# Patient Record
Sex: MALE | Race: WHITE | NOT HISPANIC OR LATINO
[De-identification: names, ages, dates, MRNs, and addresses within clinical notes are randomized per-mention and may not be internally consistent; named-entity substitution may affect disease eponyms.]

---

## 2018-07-19 ENCOUNTER — APPOINTMENT (OUTPATIENT)
Dept: ORTHOPEDIC SURGERY | Facility: CLINIC | Age: 43
End: 2018-07-19
Payer: COMMERCIAL

## 2018-07-19 DIAGNOSIS — G56.01 CARPAL TUNNEL SYNDROME, RIGHT UPPER LIMB: ICD-10-CM

## 2018-07-19 DIAGNOSIS — F17.200 NICOTINE DEPENDENCE, UNSPECIFIED, UNCOMPLICATED: ICD-10-CM

## 2018-07-19 DIAGNOSIS — Z00.00 ENCOUNTER FOR GENERAL ADULT MEDICAL EXAMINATION W/OUT ABNORMAL FINDINGS: ICD-10-CM

## 2018-07-19 PROCEDURE — 99203 OFFICE O/P NEW LOW 30 MIN: CPT

## 2018-07-19 RX ORDER — BENZONATATE 200 MG/1
200 CAPSULE ORAL
Qty: 21 | Refills: 0 | Status: ACTIVE | COMMUNITY
Start: 2018-03-01

## 2019-10-23 ENCOUNTER — APPOINTMENT (OUTPATIENT)
Dept: ORTHOPEDIC SURGERY | Facility: CLINIC | Age: 44
End: 2019-10-23
Payer: COMMERCIAL

## 2019-10-23 VITALS — SYSTOLIC BLOOD PRESSURE: 144 MMHG | HEART RATE: 66 BPM | DIASTOLIC BLOOD PRESSURE: 84 MMHG

## 2019-10-23 DIAGNOSIS — M19.011 PRIMARY OSTEOARTHRITIS, RIGHT SHOULDER: ICD-10-CM

## 2019-10-23 PROCEDURE — 99203 OFFICE O/P NEW LOW 30 MIN: CPT

## 2019-10-23 PROCEDURE — 99213 OFFICE O/P EST LOW 20 MIN: CPT

## 2019-10-23 PROCEDURE — 73030 X-RAY EXAM OF SHOULDER: CPT

## 2020-05-13 ENCOUNTER — EMERGENCY (EMERGENCY)
Facility: HOSPITAL | Age: 45
LOS: 1 days | Discharge: HOME | End: 2020-05-13
Attending: EMERGENCY MEDICINE | Admitting: EMERGENCY MEDICINE
Payer: COMMERCIAL

## 2020-05-13 VITALS
SYSTOLIC BLOOD PRESSURE: 136 MMHG | DIASTOLIC BLOOD PRESSURE: 82 MMHG | RESPIRATION RATE: 16 BRPM | HEART RATE: 74 BPM | TEMPERATURE: 98 F | OXYGEN SATURATION: 100 %

## 2020-05-13 VITALS
TEMPERATURE: 98 F | RESPIRATION RATE: 16 BRPM | OXYGEN SATURATION: 100 % | HEART RATE: 57 BPM | DIASTOLIC BLOOD PRESSURE: 75 MMHG | SYSTOLIC BLOOD PRESSURE: 116 MMHG

## 2020-05-13 LAB
ALBUMIN SERPL ELPH-MCNC: 3.9 G/DL — SIGNIFICANT CHANGE UP (ref 3.3–5)
ALP SERPL-CCNC: 65 U/L — SIGNIFICANT CHANGE UP (ref 40–120)
ALT FLD-CCNC: 9 U/L — SIGNIFICANT CHANGE UP (ref 4–41)
ANION GAP SERPL CALC-SCNC: 11 MMO/L — SIGNIFICANT CHANGE UP (ref 7–14)
ANISOCYTOSIS BLD QL: SIGNIFICANT CHANGE UP
APPEARANCE UR: SIGNIFICANT CHANGE UP
AST SERPL-CCNC: 11 U/L — SIGNIFICANT CHANGE UP (ref 4–40)
BACTERIA # UR AUTO: NEGATIVE — SIGNIFICANT CHANGE UP
BASOPHILS # BLD AUTO: 0.06 K/UL — SIGNIFICANT CHANGE UP (ref 0–0.2)
BASOPHILS NFR BLD AUTO: 0.2 % — SIGNIFICANT CHANGE UP (ref 0–2)
BASOPHILS NFR SPEC: 0 % — SIGNIFICANT CHANGE UP (ref 0–2)
BILIRUB SERPL-MCNC: 0.5 MG/DL — SIGNIFICANT CHANGE UP (ref 0.2–1.2)
BILIRUB UR-MCNC: NEGATIVE — SIGNIFICANT CHANGE UP
BLASTS # FLD: 0 % — SIGNIFICANT CHANGE UP (ref 0–0)
BLOOD UR QL VISUAL: HIGH
BUN SERPL-MCNC: 15 MG/DL — SIGNIFICANT CHANGE UP (ref 7–23)
CALCIUM SERPL-MCNC: 9.5 MG/DL — SIGNIFICANT CHANGE UP (ref 8.4–10.5)
CHLORIDE SERPL-SCNC: 99 MMOL/L — SIGNIFICANT CHANGE UP (ref 98–107)
CO2 SERPL-SCNC: 27 MMOL/L — SIGNIFICANT CHANGE UP (ref 22–31)
COLOR SPEC: YELLOW — SIGNIFICANT CHANGE UP
CREAT SERPL-MCNC: 1.12 MG/DL — SIGNIFICANT CHANGE UP (ref 0.5–1.3)
EOSINOPHIL # BLD AUTO: 0.04 K/UL — SIGNIFICANT CHANGE UP (ref 0–0.5)
EOSINOPHIL NFR BLD AUTO: 0.2 % — SIGNIFICANT CHANGE UP (ref 0–6)
EOSINOPHIL NFR FLD: 0.9 % — SIGNIFICANT CHANGE UP (ref 0–6)
GIANT PLATELETS BLD QL SMEAR: PRESENT — SIGNIFICANT CHANGE UP
GLUCOSE SERPL-MCNC: 128 MG/DL — HIGH (ref 70–99)
GLUCOSE UR-MCNC: NEGATIVE — SIGNIFICANT CHANGE UP
HCT VFR BLD CALC: 40.3 % — SIGNIFICANT CHANGE UP (ref 39–50)
HGB BLD-MCNC: 13 G/DL — SIGNIFICANT CHANGE UP (ref 13–17)
HIV COMBO RESULT: SIGNIFICANT CHANGE UP
HIV1+2 AB SPEC QL: SIGNIFICANT CHANGE UP
IMM GRANULOCYTES NFR BLD AUTO: 0.9 % — SIGNIFICANT CHANGE UP (ref 0–1.5)
KETONES UR-MCNC: NEGATIVE — SIGNIFICANT CHANGE UP
LEUKOCYTE ESTERASE UR-ACNC: SIGNIFICANT CHANGE UP
LYMPHOCYTES # BLD AUTO: 10 % — LOW (ref 13–44)
LYMPHOCYTES # BLD AUTO: 2.48 K/UL — SIGNIFICANT CHANGE UP (ref 1–3.3)
LYMPHOCYTES NFR SPEC AUTO: 10 % — LOW (ref 13–44)
MACROCYTES BLD QL: SLIGHT — SIGNIFICANT CHANGE UP
MCHC RBC-ENTMCNC: 28.5 PG — SIGNIFICANT CHANGE UP (ref 27–34)
MCHC RBC-ENTMCNC: 32.3 % — SIGNIFICANT CHANGE UP (ref 32–36)
MCV RBC AUTO: 88.4 FL — SIGNIFICANT CHANGE UP (ref 80–100)
METAMYELOCYTES # FLD: 0 % — SIGNIFICANT CHANGE UP (ref 0–1)
MONOCYTES # BLD AUTO: 1.62 K/UL — HIGH (ref 0–0.9)
MONOCYTES NFR BLD AUTO: 6.5 % — SIGNIFICANT CHANGE UP (ref 2–14)
MONOCYTES NFR BLD: 5.5 % — SIGNIFICANT CHANGE UP (ref 2–9)
MYELOCYTES NFR BLD: 0 % — SIGNIFICANT CHANGE UP (ref 0–0)
NEUTROPHIL AB SER-ACNC: 81.8 % — HIGH (ref 43–77)
NEUTROPHILS # BLD AUTO: 20.36 K/UL — HIGH (ref 1.8–7.4)
NEUTROPHILS NFR BLD AUTO: 82.2 % — HIGH (ref 43–77)
NEUTS BAND # BLD: 1.8 % — SIGNIFICANT CHANGE UP (ref 0–6)
NITRITE UR-MCNC: NEGATIVE — SIGNIFICANT CHANGE UP
NRBC # FLD: 0 K/UL — SIGNIFICANT CHANGE UP (ref 0–0)
OTHER - HEMATOLOGY %: 0 — SIGNIFICANT CHANGE UP
OVALOCYTES BLD QL SMEAR: SLIGHT — SIGNIFICANT CHANGE UP
PH UR: 6 — SIGNIFICANT CHANGE UP (ref 5–8)
PLATELET # BLD AUTO: 292 K/UL — SIGNIFICANT CHANGE UP (ref 150–400)
PLATELET COUNT - ESTIMATE: NORMAL — SIGNIFICANT CHANGE UP
PMV BLD: 10 FL — SIGNIFICANT CHANGE UP (ref 7–13)
POIKILOCYTOSIS BLD QL AUTO: SLIGHT — SIGNIFICANT CHANGE UP
POLYCHROMASIA BLD QL SMEAR: SIGNIFICANT CHANGE UP
POTASSIUM SERPL-MCNC: 4.1 MMOL/L — SIGNIFICANT CHANGE UP (ref 3.5–5.3)
POTASSIUM SERPL-SCNC: 4.1 MMOL/L — SIGNIFICANT CHANGE UP (ref 3.5–5.3)
PROMYELOCYTES # FLD: 0 % — SIGNIFICANT CHANGE UP (ref 0–0)
PROT SERPL-MCNC: 7.8 G/DL — SIGNIFICANT CHANGE UP (ref 6–8.3)
PROT UR-MCNC: 100 — HIGH
RBC # BLD: 4.56 M/UL — SIGNIFICANT CHANGE UP (ref 4.2–5.8)
RBC # FLD: 12.4 % — SIGNIFICANT CHANGE UP (ref 10.3–14.5)
RBC CASTS # UR COMP ASSIST: HIGH (ref 0–?)
SODIUM SERPL-SCNC: 137 MMOL/L — SIGNIFICANT CHANGE UP (ref 135–145)
SP GR SPEC: 1.03 — SIGNIFICANT CHANGE UP (ref 1–1.04)
SQUAMOUS # UR AUTO: SIGNIFICANT CHANGE UP
UROBILINOGEN FLD QL: SIGNIFICANT CHANGE UP
VARIANT LYMPHS # BLD: 0 % — SIGNIFICANT CHANGE UP
WBC # BLD: 24.79 K/UL — HIGH (ref 3.8–10.5)
WBC # FLD AUTO: 24.79 K/UL — HIGH (ref 3.8–10.5)
WBC UR QL: >50 — HIGH (ref 0–?)

## 2020-05-13 PROCEDURE — 76870 US EXAM SCROTUM: CPT | Mod: 26

## 2020-05-13 PROCEDURE — 99285 EMERGENCY DEPT VISIT HI MDM: CPT

## 2020-05-13 RX ORDER — ACETAMINOPHEN 500 MG
650 TABLET ORAL ONCE
Refills: 0 | Status: COMPLETED | OUTPATIENT
Start: 2020-05-13 | End: 2020-05-13

## 2020-05-13 RX ORDER — SODIUM CHLORIDE 9 MG/ML
1000 INJECTION INTRAMUSCULAR; INTRAVENOUS; SUBCUTANEOUS ONCE
Refills: 0 | Status: COMPLETED | OUTPATIENT
Start: 2020-05-13 | End: 2020-05-13

## 2020-05-13 RX ORDER — KETOROLAC TROMETHAMINE 30 MG/ML
15 SYRINGE (ML) INJECTION ONCE
Refills: 0 | Status: DISCONTINUED | OUTPATIENT
Start: 2020-05-13 | End: 2020-05-13

## 2020-05-13 RX ORDER — MORPHINE SULFATE 50 MG/1
4 CAPSULE, EXTENDED RELEASE ORAL ONCE
Refills: 0 | Status: DISCONTINUED | OUTPATIENT
Start: 2020-05-13 | End: 2020-05-13

## 2020-05-13 RX ORDER — CEFTRIAXONE 500 MG/1
250 INJECTION, POWDER, FOR SOLUTION INTRAMUSCULAR; INTRAVENOUS ONCE
Refills: 0 | Status: COMPLETED | OUTPATIENT
Start: 2020-05-13 | End: 2020-05-13

## 2020-05-13 RX ADMIN — CEFTRIAXONE 250 MILLIGRAM(S): 500 INJECTION, POWDER, FOR SOLUTION INTRAMUSCULAR; INTRAVENOUS at 09:46

## 2020-05-13 RX ADMIN — SODIUM CHLORIDE 1000 MILLILITER(S): 9 INJECTION INTRAMUSCULAR; INTRAVENOUS; SUBCUTANEOUS at 08:09

## 2020-05-13 RX ADMIN — SODIUM CHLORIDE 500 MILLILITER(S): 9 INJECTION INTRAMUSCULAR; INTRAVENOUS; SUBCUTANEOUS at 07:09

## 2020-05-13 RX ADMIN — Medication 15 MILLIGRAM(S): at 07:08

## 2020-05-13 RX ADMIN — Medication 100 MILLIGRAM(S): at 09:46

## 2020-05-13 RX ADMIN — Medication 650 MILLIGRAM(S): at 07:08

## 2020-05-13 RX ADMIN — MORPHINE SULFATE 4 MILLIGRAM(S): 50 CAPSULE, EXTENDED RELEASE ORAL at 08:15

## 2020-05-13 NOTE — ED PROVIDER NOTE - PROGRESS NOTE DETAILS
SHIRA PALMER: Patient signed out to me by resident Dr. Franks to f/u testicular US w/ concern for pylocele on previous US. Will continue to monitor and reassess. PA SPENCER: US testicle shows epididymo-orchitis, no fluid collection, no mass. As per attending, will give Ceftriaxone IM 250mg once and Doxycycline BIDx10 days. Pt admits he took Augmentin x2 last night. Plan: antibiotic and discharge w/ f/u with urology. PA SPENCER: Pt is medically stable for discharge and follow up with PMD and urology. The patient was given verbal and written discharge instructions. Specifically, instructions when to return to the ED and when to seek follow-up from their pcp was discussed. Any specialty follow-up was discussed, including how to make an appointment.  Instructions were discussed in simple, plain language and was understood by the patient. The patient understands that should their symptoms worsen or any new symptoms arise, they should return to the ED immediately for further evaluation. All pt's questions were answered. Patient verbalizes understanding.

## 2020-05-13 NOTE — ED PROVIDER NOTE - OBJECTIVE STATEMENT
44M hx psoriasis, p/w right testicular pain/ swelling x 4-5 days, with low grade subjective fevers, with 8/10 heavy pain sensation, with +urinary urgency. Sent by PMD for outpt ultrasound results from yesterday with findings of ' R epididymoorchitis with overlying probable pyocele'. Pt states right testicle swelling has been improving and pain improving,  no reported dysuria or penile discharge. Monogamous with one partner. Denied STD risks. No GI complaints. Pt was started on abx, last dose last evening, cant remember the name of it but states it is q6h. Last tylenol at 2am     Dr Collazo [phone] 44M hx psoriasis, p/w right testicular pain/ swelling x 4-5 days, with low grade subjective fevers, with 8/10 heavy pain sensation, with +urinary urgency. Sent by PMD for outpt ultrasound results from yesterday with findings of ' R epididymoorchitis with overlying probable pyocele'. Pt states right testicle swelling has been improving and pain improving,  no reported dysuria or penile discharge. Monogamous with one partner. Denied STD risks. No GI complaints. Pt was started on abx, last dose last evening, cant remember the name of it but states it is q6h. Last tylenol at 2am     pmd Dr Collazo [phone] 44M hx psoriasis, p/w right testicular pain/ swelling x 4-5 days, with low grade subjective fevers, with 8/10 heavy pain sensation, with +urinary urgency. Sent by PMD for outpt ultrasound results from yesterday with findings of ' R epididymoorchitis with overlying probable pyocele'. Pt states right testicle swelling has been improving and pain improving,  no reported dysuria or penile discharge. Monogamous with one partner. Denied STD risks. No GI complaints. Pt was started on abx yesterday, last dose last evening, cant remember the name of it but states it is q6h and a penicillin. Last tylenol at 2am     pmd Dr Collazo [phone]

## 2020-05-13 NOTE — ED ADULT TRIAGE NOTE - CHIEF COMPLAINT QUOTE
Pt awake, alert sent in my PMD for infection of testicle. Pain present to testicle area, subjective fevers. No PMH, denies allergies

## 2020-05-13 NOTE — ED PROVIDER NOTE - PATIENT PORTAL LINK FT
You can access the FollowMyHealth Patient Portal offered by [facility] by registering at the following website: [email or web address]. By joining [employer]’s FollowMyHealth portal, you will also be able to view your health information using other applications (apps) compatible with our system.

## 2020-05-13 NOTE — ED PROVIDER NOTE - NSFOLLOWUPINSTRUCTIONS_ED_ALL_ED_FT
Rest, drink plenty of fluids.  Advance activity as tolerated. Take Doxycycline 100 mg, two times a day for 10 days. Follow up with your primary care physician and urology in 48-72 hours- bring copies of your results. Take Motrin 600 mg every 8 hours as needed for moderate pain -- take with food. Return to the ER for worsening or persistent symptoms, and/or ANY NEW OR CONCERNING SYMPTOMS. If you have issues obtaining follow up, please call: [phone] to obtain a doctor or specialist who takes your insurance in your area.

## 2020-05-13 NOTE — ED PROVIDER NOTE - ATTENDING CONTRIBUTION TO CARE
45 yo with 4-5 days R testicle swelling and pain.  Got worse now with 8/10 pain heavy no radiation.  Assoc with some low grade subjective fevers.  Had an us outpatient that showed R epididymoorchitis with overlying pyocele.      Gen: Well appearing in NAD  Head: NC/AT  Neck: trachea midline  Resp:  No distress  :  R testicle swollen indurated TTP no fluctuance _ Chaperone PA Daly  Ext: no deformities  Neuro:  A&O appears non focal  Skin:  Warm and dry as visualized  Psych:  Normal affect and mood     45 yo with known epidoomrchitis with possible pyocele.  Will need repeat imaging and labs.  Will consul uro if warranted.  Morphine for pain

## 2020-05-13 NOTE — ED ADULT NURSE REASSESSMENT NOTE - NS ED NURSE REASSESS COMMENT FT1
Assumed care. Pt resting in stretcher, rates pain at a 7/10, medicated per order. Awaiting u/s at this time. VSS. Will continue to monitor.

## 2020-05-13 NOTE — ED PROVIDER NOTE - CLINICAL SUMMARY MEDICAL DECISION MAKING FREE TEXT BOX
44M w/ outpt US findings suggestive of pyocele with epididymoorchitis, pt on abx already, no signs of crepitus or perineal erythema suggestive of fourniers, will repeat ultrasound to evaluate extent of possible pyocele and consult urology as needed, will obtain labs, ua, ucx, pain control.

## 2020-05-13 NOTE — ED ADULT NURSE NOTE - OBJECTIVE STATEMENT
Patient received with c/o testicular swelling x. Scrotum markedly swollen, R side is tender, with intermittent suprapubic pain. Pt endorsed dysuria and urinary urgency. Denies burning on micturition, Denies hematuria. pt denies any bowel issues. Respiration is even and unlabored. No CP or SOB reported. Writer at bedside with MD Franks for assessment. 20g angiocath placed on R AC. Will monitor. Patient received with c/o testicular swelling x5days. Scrotum markedly swollen, R side is tender, with intermittent suprapubic pain. Pt endorsed dysuria and urinary urgency. Denies burning on micturition, Denies hematuria. pt denies any bowel issues. Respiration is even and unlabored. No CP or SOB reported. Writer at bedside with MD Franks for assessment. 20g angiocath placed on R AC. Will monitor.

## 2020-05-14 LAB
C TRACH RRNA SPEC QL NAA+PROBE: SIGNIFICANT CHANGE UP
CULTURE RESULTS: NO GROWTH — SIGNIFICANT CHANGE UP
N GONORRHOEA RRNA SPEC QL NAA+PROBE: SIGNIFICANT CHANGE UP
SPECIMEN SOURCE: SIGNIFICANT CHANGE UP
SPECIMEN SOURCE: SIGNIFICANT CHANGE UP

## 2020-05-15 ENCOUNTER — APPOINTMENT (OUTPATIENT)
Dept: UROLOGY | Facility: CLINIC | Age: 45
End: 2020-05-15
Payer: COMMERCIAL

## 2020-05-15 VITALS
OXYGEN SATURATION: 100 % | WEIGHT: 190 LBS | HEART RATE: 85 BPM | TEMPERATURE: 98.7 F | DIASTOLIC BLOOD PRESSURE: 80 MMHG | SYSTOLIC BLOOD PRESSURE: 132 MMHG | BODY MASS INDEX: 27.2 KG/M2 | RESPIRATION RATE: 14 BRPM | HEIGHT: 70 IN

## 2020-05-15 PROBLEM — L40.9 PSORIASIS, UNSPECIFIED: Chronic | Status: ACTIVE | Noted: 2020-05-13

## 2020-05-15 PROCEDURE — 99204 OFFICE O/P NEW MOD 45 MIN: CPT

## 2020-05-15 RX ORDER — LEVOFLOXACIN 500 MG/1
500 TABLET, FILM COATED ORAL DAILY
Qty: 14 | Refills: 0 | Status: ACTIVE | COMMUNITY
Start: 2018-03-01 | End: 1900-01-01

## 2020-05-15 RX ORDER — MELOXICAM 15 MG/1
15 TABLET ORAL
Qty: 30 | Refills: 1 | Status: COMPLETED | COMMUNITY
Start: 2019-10-23 | End: 2020-05-15

## 2020-05-15 RX ORDER — IPRATROPIUM BROMIDE 42 UG/1
0.06 SPRAY NASAL
Qty: 15 | Refills: 0 | Status: COMPLETED | COMMUNITY
Start: 2018-03-01 | End: 2020-05-15

## 2020-05-15 RX ORDER — PANTOPRAZOLE 40 MG/1
40 TABLET, DELAYED RELEASE ORAL
Qty: 30 | Refills: 0 | Status: COMPLETED | COMMUNITY
Start: 2018-02-09 | End: 2020-05-15

## 2020-05-15 RX ORDER — NAPROXEN 500 MG/1
500 TABLET ORAL
Refills: 0 | Status: COMPLETED | COMMUNITY
End: 2020-05-15

## 2020-05-15 NOTE — REVIEW OF SYSTEMS
[see HPI] : see HPI [Negative] : Heme/Lymph [Wake up at night to urinate  How many times?  ___] : wakes up to urinate [unfilled] times during the night [Slow urine stream] : slow urine stream [Heartburn] : heartburn [Itching] : itching

## 2020-05-15 NOTE — PHYSICAL EXAM
[General Appearance - Well Developed] : well developed [General Appearance - Well Nourished] : well nourished [Normal Appearance] : normal appearance [Well Groomed] : well groomed [General Appearance - In No Acute Distress] : no acute distress [Edema] : no peripheral edema [Respiration, Rhythm And Depth] : normal respiratory rhythm and effort [Exaggerated Use Of Accessory Muscles For Inspiration] : no accessory muscle use [Abdomen Soft] : soft [Abdomen Tenderness] : non-tender [Costovertebral Angle Tenderness] : no ~M costovertebral angle tenderness [Urethral Meatus] : meatus normal [Urinary Bladder Findings] : the bladder was normal on palpation [Scrotum] : the scrotum was normal [Testes Mass (___cm)] : there were no testicular masses [No Prostate Nodules] : no prostate nodules [Normal Station and Gait] : the gait and station were normal for the patient's age [] : no rash [No Focal Deficits] : no focal deficits [Affect] : the affect was normal [Oriented To Time, Place, And Person] : oriented to person, place, and time [Mood] : the mood was normal [Not Anxious] : not anxious [No Palpable Adenopathy] : no palpable adenopathy

## 2020-05-15 NOTE — HISTORY OF PRESENT ILLNESS
[Currently Experiencing ___] :  [unfilled] [6] : 6 [FreeTextEntry1] : 45y/o man\par Hospital records reviewed, labs, imaging reviewed.\par Seen 2 weeks ago by PCP who started Augmentin and gave injection of "penicillin". PCP prescribed Tramadol for pain.\par Seen in ED for epididymo-orchitis on 5/13/20.\par Treating with course of Doxycycline.\par Improving compared to 2 weeks ago, but still swollen and tender.\par \par , no extra-marital contacts.

## 2020-05-15 NOTE — ASSESSMENT
[FreeTextEntry1] : Resolving RIGHT epididymo-orchitis.\par Complete course of doxycycline antibiotics.\par Added levaquin 500mg daily x 14 days.\par ibuprofen and tramadol prn for pain.\par warm soaks in tub daily for 30 mins x 5 days.\par scrotal elevation.\par Return sooner if worsens.

## 2020-05-18 LAB — BACTERIA UR CULT: NORMAL

## 2020-06-12 ENCOUNTER — APPOINTMENT (OUTPATIENT)
Dept: UROLOGY | Facility: CLINIC | Age: 45
End: 2020-06-12
Payer: COMMERCIAL

## 2020-06-12 VITALS
TEMPERATURE: 98.1 F | WEIGHT: 190 LBS | BODY MASS INDEX: 27.2 KG/M2 | SYSTOLIC BLOOD PRESSURE: 123 MMHG | DIASTOLIC BLOOD PRESSURE: 84 MMHG | HEART RATE: 61 BPM | HEIGHT: 70 IN | RESPIRATION RATE: 14 BRPM | OXYGEN SATURATION: 98 %

## 2020-06-12 DIAGNOSIS — N45.3 EPIDIDYMO-ORCHITIS: ICD-10-CM

## 2020-06-12 PROCEDURE — 99212 OFFICE O/P EST SF 10 MIN: CPT

## 2020-06-12 NOTE — PHYSICAL EXAM
[General Appearance - Well Developed] : well developed [General Appearance - Well Nourished] : well nourished [Normal Appearance] : normal appearance [General Appearance - In No Acute Distress] : no acute distress [Well Groomed] : well groomed [Abdomen Soft] : soft [Costovertebral Angle Tenderness] : no ~M costovertebral angle tenderness [Abdomen Tenderness] : non-tender [Urethral Meatus] : meatus normal [Penis Abnormality] : normal circumcised penis [Scrotum] : the scrotum was normal [Urinary Bladder Findings] : the bladder was normal on palpation [Testes Tenderness] : no tenderness of the testes [Testes Mass (___cm)] : there were no testicular masses [Prostate Tenderness] : the prostate was not tender [No Prostate Nodules] : no prostate nodules [Skin Color & Pigmentation] : normal skin color and pigmentation [Edema] : no peripheral edema [] : no respiratory distress [Exaggerated Use Of Accessory Muscles For Inspiration] : no accessory muscle use [Respiration, Rhythm And Depth] : normal respiratory rhythm and effort [Oriented To Time, Place, And Person] : oriented to person, place, and time [Affect] : the affect was normal [Mood] : the mood was normal [Not Anxious] : not anxious [Normal Station and Gait] : the gait and station were normal for the patient's age [Inguinal Lymph Nodes Enlarged Bilaterally] : inguinal

## 2020-06-12 NOTE — HISTORY OF PRESENT ILLNESS
[FreeTextEntry1] : see notes from last visit\par f/u for epididymoorchitis\par pain resolved\par swelling resolved\par feels normal\par no LUTS

## 2022-03-01 ENCOUNTER — INPATIENT (INPATIENT)
Facility: HOSPITAL | Age: 47
LOS: 0 days | Discharge: HOME | DRG: 204 | End: 2022-03-02
Attending: INTERNAL MEDICINE | Admitting: INTERNAL MEDICINE
Payer: COMMERCIAL

## 2022-03-01 VITALS
RESPIRATION RATE: 20 BRPM | HEIGHT: 68 IN | HEART RATE: 77 BPM | DIASTOLIC BLOOD PRESSURE: 92 MMHG | WEIGHT: 199.96 LBS | TEMPERATURE: 98 F | SYSTOLIC BLOOD PRESSURE: 163 MMHG | OXYGEN SATURATION: 98 %

## 2022-03-01 DIAGNOSIS — Z90.89 ACQUIRED ABSENCE OF OTHER ORGANS: Chronic | ICD-10-CM

## 2022-03-01 DIAGNOSIS — R07.9 CHEST PAIN, UNSPECIFIED: ICD-10-CM

## 2022-03-01 LAB
ALBUMIN SERPL ELPH-MCNC: 4.6 G/DL — SIGNIFICANT CHANGE UP (ref 3.3–5)
ALP SERPL-CCNC: 75 U/L — SIGNIFICANT CHANGE UP (ref 40–120)
ALT FLD-CCNC: 27 U/L — SIGNIFICANT CHANGE UP (ref 10–45)
ANION GAP SERPL CALC-SCNC: 10 MMOL/L — SIGNIFICANT CHANGE UP (ref 5–17)
ANION GAP SERPL CALC-SCNC: 14 MMOL/L — SIGNIFICANT CHANGE UP (ref 5–17)
AST SERPL-CCNC: 21 U/L — SIGNIFICANT CHANGE UP (ref 10–40)
BASOPHILS # BLD AUTO: 0.03 K/UL — SIGNIFICANT CHANGE UP (ref 0–0.2)
BASOPHILS NFR BLD AUTO: 0.3 % — SIGNIFICANT CHANGE UP (ref 0–2)
BILIRUB SERPL-MCNC: 0.4 MG/DL — SIGNIFICANT CHANGE UP (ref 0.2–1.2)
BUN SERPL-MCNC: 22 MG/DL — SIGNIFICANT CHANGE UP (ref 7–23)
BUN SERPL-MCNC: 24 MG/DL — HIGH (ref 7–23)
CALCIUM SERPL-MCNC: 9.3 MG/DL — SIGNIFICANT CHANGE UP (ref 8.4–10.5)
CALCIUM SERPL-MCNC: 9.4 MG/DL — SIGNIFICANT CHANGE UP (ref 8.4–10.5)
CHLORIDE SERPL-SCNC: 101 MMOL/L — SIGNIFICANT CHANGE UP (ref 96–108)
CHLORIDE SERPL-SCNC: 102 MMOL/L — SIGNIFICANT CHANGE UP (ref 96–108)
CK MB BLD-MCNC: 1.6 % — SIGNIFICANT CHANGE UP (ref 0–3.5)
CK MB CFR SERPL CALC: 1.7 NG/ML — SIGNIFICANT CHANGE UP (ref 0–6.7)
CK SERPL-CCNC: 106 U/L — SIGNIFICANT CHANGE UP (ref 30–200)
CO2 SERPL-SCNC: 22 MMOL/L — SIGNIFICANT CHANGE UP (ref 22–31)
CO2 SERPL-SCNC: 27 MMOL/L — SIGNIFICANT CHANGE UP (ref 22–31)
CREAT SERPL-MCNC: 1.04 MG/DL — SIGNIFICANT CHANGE UP (ref 0.5–1.3)
CREAT SERPL-MCNC: 1.12 MG/DL — SIGNIFICANT CHANGE UP (ref 0.5–1.3)
EGFR: 81 ML/MIN/1.73M2 — SIGNIFICANT CHANGE UP
EGFR: 89 ML/MIN/1.73M2 — SIGNIFICANT CHANGE UP
EOSINOPHIL # BLD AUTO: 0.04 K/UL — SIGNIFICANT CHANGE UP (ref 0–0.5)
EOSINOPHIL NFR BLD AUTO: 0.3 % — SIGNIFICANT CHANGE UP (ref 0–6)
GLUCOSE SERPL-MCNC: 119 MG/DL — HIGH (ref 70–99)
GLUCOSE SERPL-MCNC: 147 MG/DL — HIGH (ref 70–99)
HCT VFR BLD CALC: 42.7 % — SIGNIFICANT CHANGE UP (ref 39–50)
HCT VFR BLD CALC: 44.4 % — SIGNIFICANT CHANGE UP (ref 39–50)
HGB BLD-MCNC: 13.7 G/DL — SIGNIFICANT CHANGE UP (ref 13–17)
HGB BLD-MCNC: 14.4 G/DL — SIGNIFICANT CHANGE UP (ref 13–17)
IMM GRANULOCYTES NFR BLD AUTO: 0.5 % — SIGNIFICANT CHANGE UP (ref 0–1.5)
LYMPHOCYTES # BLD AUTO: 1.87 K/UL — SIGNIFICANT CHANGE UP (ref 1–3.3)
LYMPHOCYTES # BLD AUTO: 15.8 % — SIGNIFICANT CHANGE UP (ref 13–44)
MAGNESIUM SERPL-MCNC: 2.2 MG/DL — SIGNIFICANT CHANGE UP (ref 1.6–2.6)
MCHC RBC-ENTMCNC: 28.5 PG — SIGNIFICANT CHANGE UP (ref 27–34)
MCHC RBC-ENTMCNC: 29.4 PG — SIGNIFICANT CHANGE UP (ref 27–34)
MCHC RBC-ENTMCNC: 32.1 GM/DL — SIGNIFICANT CHANGE UP (ref 32–36)
MCHC RBC-ENTMCNC: 32.4 GM/DL — SIGNIFICANT CHANGE UP (ref 32–36)
MCV RBC AUTO: 88.8 FL — SIGNIFICANT CHANGE UP (ref 80–100)
MCV RBC AUTO: 90.6 FL — SIGNIFICANT CHANGE UP (ref 80–100)
MONOCYTES # BLD AUTO: 0.67 K/UL — SIGNIFICANT CHANGE UP (ref 0–0.9)
MONOCYTES NFR BLD AUTO: 5.7 % — SIGNIFICANT CHANGE UP (ref 2–14)
NEUTROPHILS # BLD AUTO: 9.14 K/UL — HIGH (ref 1.8–7.4)
NEUTROPHILS NFR BLD AUTO: 77.4 % — HIGH (ref 43–77)
NRBC # BLD: 0 /100 WBCS — SIGNIFICANT CHANGE UP (ref 0–0)
NRBC # BLD: 0 /100 WBCS — SIGNIFICANT CHANGE UP (ref 0–0)
NT-PROBNP SERPL-SCNC: 28 PG/ML — SIGNIFICANT CHANGE UP (ref 0–300)
PLATELET # BLD AUTO: 224 K/UL — SIGNIFICANT CHANGE UP (ref 150–400)
PLATELET # BLD AUTO: 237 K/UL — SIGNIFICANT CHANGE UP (ref 150–400)
POTASSIUM SERPL-MCNC: 3.8 MMOL/L — SIGNIFICANT CHANGE UP (ref 3.5–5.3)
POTASSIUM SERPL-MCNC: 4.6 MMOL/L — SIGNIFICANT CHANGE UP (ref 3.5–5.3)
POTASSIUM SERPL-SCNC: 3.8 MMOL/L — SIGNIFICANT CHANGE UP (ref 3.5–5.3)
POTASSIUM SERPL-SCNC: 4.6 MMOL/L — SIGNIFICANT CHANGE UP (ref 3.5–5.3)
PROT SERPL-MCNC: 7.4 G/DL — SIGNIFICANT CHANGE UP (ref 6–8.3)
RBC # BLD: 4.81 M/UL — SIGNIFICANT CHANGE UP (ref 4.2–5.8)
RBC # BLD: 4.9 M/UL — SIGNIFICANT CHANGE UP (ref 4.2–5.8)
RBC # FLD: 13.5 % — SIGNIFICANT CHANGE UP (ref 10.3–14.5)
RBC # FLD: 13.7 % — SIGNIFICANT CHANGE UP (ref 10.3–14.5)
SARS-COV-2 RNA SPEC QL NAA+PROBE: SIGNIFICANT CHANGE UP
SODIUM SERPL-SCNC: 138 MMOL/L — SIGNIFICANT CHANGE UP (ref 135–145)
SODIUM SERPL-SCNC: 138 MMOL/L — SIGNIFICANT CHANGE UP (ref 135–145)
TROPONIN T, HIGH SENSITIVITY RESULT: <6 NG/L — SIGNIFICANT CHANGE UP (ref 0–51)
TROPONIN T, HIGH SENSITIVITY RESULT: <6 NG/L — SIGNIFICANT CHANGE UP (ref 0–51)
WBC # BLD: 11.81 K/UL — HIGH (ref 3.8–10.5)
WBC # BLD: 9.97 K/UL — SIGNIFICANT CHANGE UP (ref 3.8–10.5)
WBC # FLD AUTO: 11.81 K/UL — HIGH (ref 3.8–10.5)
WBC # FLD AUTO: 9.97 K/UL — SIGNIFICANT CHANGE UP (ref 3.8–10.5)

## 2022-03-01 PROCEDURE — 93010 ELECTROCARDIOGRAM REPORT: CPT

## 2022-03-01 PROCEDURE — 99285 EMERGENCY DEPT VISIT HI MDM: CPT

## 2022-03-01 PROCEDURE — 71046 X-RAY EXAM CHEST 2 VIEWS: CPT | Mod: 26

## 2022-03-01 RX ORDER — ATORVASTATIN CALCIUM 80 MG/1
80 TABLET, FILM COATED ORAL AT BEDTIME
Refills: 0 | Status: DISCONTINUED | OUTPATIENT
Start: 2022-03-01 | End: 2022-03-02

## 2022-03-01 RX ORDER — ASPIRIN/CALCIUM CARB/MAGNESIUM 324 MG
81 TABLET ORAL DAILY
Refills: 0 | Status: DISCONTINUED | OUTPATIENT
Start: 2022-03-01 | End: 2022-03-02

## 2022-03-01 RX ORDER — HEPARIN SODIUM 5000 [USP'U]/ML
5000 INJECTION INTRAVENOUS; SUBCUTANEOUS EVERY 12 HOURS
Refills: 0 | Status: DISCONTINUED | OUTPATIENT
Start: 2022-03-01 | End: 2022-03-02

## 2022-03-01 RX ORDER — LOSARTAN POTASSIUM 100 MG/1
25 TABLET, FILM COATED ORAL DAILY
Refills: 0 | Status: DISCONTINUED | OUTPATIENT
Start: 2022-03-01 | End: 2022-03-02

## 2022-03-01 NOTE — CONSULT NOTE ADULT - SUBJECTIVE AND OBJECTIVE BOX
CHIEF COMPLAINT:Patient is a 46y old  Male who presents with a chief complaint of cp/sob (01 Mar 2022 12:27)      HPI:   46y male pmh COPD/Current Smoker, GERD,p/w 5-7 d of intermittent substernal CP, with exertional component,/sob   Recent history of bronchitis, finished ABX 10 d ago. Finishing a steroid taper.   He stated that the pain is not positional and not associated with eating. He does report a pleuritic component, no history of blodo clots.   He denies any fevers, chills, worsening cough, palpitations, LE pain or edema, nausea, vomiting or any other complaints at this time.   No history of CAD, no HTN, no fam history of CAD.       PAST MEDICAL & SURGICAL HISTORY:  Chronic obstructive pulmonary disease (COPD)    COVID-19 vaccine series completed  Pfizer    GERD (gastroesophageal reflux disease)    S/P tonsillectomy        MEDICATIONS  (STANDING):  aspirin  chewable 81 milliGRAM(s) Oral daily  heparin   Injectable 5000 Unit(s) SubCutaneous every 12 hours  losartan 25 milliGRAM(s) Oral daily    MEDICATIONS  (PRN):      FAMILY HISTORY:      SOCIAL HISTORY:    [ ] Non-smoker  [ ] Smoker  [ ] Alcohol    Allergies    No Known Allergies    Intolerances    	    REVIEW OF SYSTEMS:  CONSTITUTIONAL: No fever, weight loss, or fatigue  EYES: No eye pain, visual disturbances, or discharge  ENT:  No difficulty hearing, tinnitus, vertigo; No sinus or throat pain  NECK: No pain or stiffness  RESPIRATORY: No cough, wheezing, chills or hemoptysis; + Shortness of Breath  CARDIOVASCULAR: + chest pain, no  palpitations, passing out, dizziness, or leg swelling  GASTROINTESTINAL: No abdominal or epigastric pain. No nausea, vomiting, or hematemesis; No diarrhea or constipation. No melena or hematochezia.  GENITOURINARY: No dysuria, frequency, hematuria, or incontinence  NEUROLOGICAL: No headaches, memory loss, loss of strength, numbness, or tremors  SKIN: No itching, burning, rashes, or lesions   LYMPH Nodes: No enlarged glands  ENDOCRINE: No heat or cold intolerance; No hair loss  MUSCULOSKELETAL: No joint pain or swelling; No muscle, back, or extremity pain  PSYCHIATRIC: No depression, anxiety, mood swings, or difficulty sleeping  HEME/LYMPH: No easy bruising, or bleeding gums  ALLERGY AND IMMUNOLOGIC: No hives or eczema	    [ ] All others negative	  [ ] Unable to obtain    PHYSICAL EXAM:  T(C): 36.7 (03-01-22 @ 20:36), Max: 37.2 (03-01-22 @ 11:28)  HR: 58 (03-01-22 @ 20:36) (56 - 77)  BP: 123/76 (03-01-22 @ 20:36) (110/84 - 163/92)  RR: 16 (03-01-22 @ 20:36) (15 - 20)  SpO2: 96% (03-01-22 @ 20:36) (96% - 100%)  Wt(kg): --  I&O's Summary      Appearance: Normal	  HEENT:   Normal oral mucosa, PERRL, EOMI	  Lymphatic: No lymphadenopathy  Cardiovascular: Normal S1 S2, No JVD, + murmurs, No edema  Respiratory: rhonchi	  Psychiatry: A & O x 3, Mood & affect appropriate  Gastrointestinal:  Soft, Non-tender, + BS	  Skin: No rashes, No ecchymoses, No cyanosis	  Neurologic: Non-focal  Extremities: Normal range of motion, No clubbing, cyanosis or edema  Vascular: Peripheral pulses palpable 2+ bilaterally    TELEMETRY: 	    ECG:  	  RADIOLOGY:  OTHER: 	  	  LABS:	 	    CARDIAC MARKERS:                              13.7   9.97  )-----------( 224      ( 01 Mar 2022 20:11 )             42.7     03-01    138  |  102  |  24<H>  ----------------------------<  119<H>  4.6   |  22  |  1.04    Ca    9.4      01 Mar 2022 11:42  Mg     2.2     03-01    TPro  7.4  /  Alb  4.6  /  TBili  0.4  /  DBili  x   /  AST  21  /  ALT  27  /  AlkPhos  75  03-01    proBNP: Serum Pro-Brain Natriuretic Peptide: 28 pg/mL (03-01 @ 11:42)    Lipid Profile:   HgA1c:   TSH:       PREVIOUS DIAGNOSTIC TESTING:    < from: Xray Chest 2 Views PA/Lat (03.01.22 @ 11:52) >  FINDINGS:  The heart size is normal.  The lungs are clear.  There is no pneumothorax or pleural effusion.    IMPRESSION:  Clear lungs.    · EKG Date/Time: 01-Mar-2022 10:48  · Rate: 65  · Interpretation: normal sinus rhythm  · Axis: Normal  · RI: 122  · QRS: 92  · ST/T Wave: no CINTHIA or depression, no TWIs

## 2022-03-01 NOTE — ED PROVIDER NOTE - CLINICAL SUMMARY MEDICAL DECISION MAKING FREE TEXT BOX
Rico Wilks MD (PGY-2): 46y male pmh COPD/Current Smoker, GERD, p/w 5-7 d of intermittent substernal CP, with exertional component, without radiation and associated with SOB. HD stable, afebrile, story concerning for ACS vs. less likely costochondritis, pleurisy 2/2 recent URI, low risk for PE. Will obntain cbc/cmp/trop/cxr and reassess. Pt sent in by outpatient cardiologist for possible nuke stress.

## 2022-03-01 NOTE — H&P ADULT - ASSESSMENT
46y male     pmh COPD/Current Smoker, GERD,     p/w 5-7 d of intermittent substernal CP, with exertional component, /sob   Recent history of bronchitis, finished ABX 10 d ago. Finishing a steroid taper.    no history of blodo clots./  denies any fevers, chills, worsening cough, palpitations, LE pain or edema,   No history of CAD, no HTN, no fam history of CAD.       admitted with cp/sob   tele'  troponin is normal   echo/  card dr casillas   on asa/ lipitor/ cozaar   on dvt ppx   defer ischemic  w/p to card/ stress  test  smoking  cessation/ counselling done/ pt not reday yet  cxr report pending

## 2022-03-01 NOTE — H&P ADULT - NSHPREVIEWOFSYSTEMS_GEN_ALL_CORE
REVIEW OF SYSTEMS:  GEN: no fever,    no chills  RESP: SOB,   no cough  CVS:  chest pain,   no palpitations  GI: no abdominal pain,   no nausea,   no vomiting,   no constipation,   no diarrhea  : no dysuria,   no frequency  NEURO: no headache,   no dizziness  PSYCH: no depression,   not anxious  Derm : no rash

## 2022-03-01 NOTE — H&P ADULT - NSHPPHYSICALEXAM_GEN_ALL_CORE
PHYSICAL EXAMINATION:  Vital Signs Last 24 Hrs  T(C): 37.2 (01 Mar 2022 11:28), Max: 37.2 (01 Mar 2022 11:28)  T(F): 98.9 (01 Mar 2022 11:28), Max: 98.9 (01 Mar 2022 11:28)  HR: 66 (01 Mar 2022 11:28) (66 - 77)  BP: 157/76 (01 Mar 2022 11:28) (157/76 - 163/92)  BP(mean): --  RR: 15 (01 Mar 2022 11:28) (15 - 20)  SpO2: 100% (01 Mar 2022 11:28) (98% - 100%)  CAPILLARY BLOOD GLUCOSE            GENERAL: NAD, well-groomed,  HEAD:  atraumatic, normocephalic  EYES: sclera anicteric  ENMT: mucous membranes moist  NECK: supple, No JVD  CHEST/LUNG: clear to auscultation bilaterally;    no      rales   ,   no rhonchi,   HEART: normal S1, S2  ABDOMEN: BS+, soft, ND, NT   EXTREMITIES:    no    edema    b/l LEs  NEURO: awake, ,     moves all extremities  SKIN: no     rash

## 2022-03-01 NOTE — ED PROVIDER NOTE - NSICDXPASTMEDICALHX_GEN_ALL_CORE_FT
PAST MEDICAL HISTORY:  Chronic obstructive pulmonary disease (COPD)     COVID-19 vaccine series completed Pfizer    GERD (gastroesophageal reflux disease)

## 2022-03-01 NOTE — ED ADULT NURSE NOTE - OBJECTIVE STATEMENT
47 yo male with no significant PMH presents to the ED ambulatory with steady gait complaining of chest pain x 1 week. Patient recently had bronchitis, finished abx about 10 days ago and as of last week has been experiencing R sided pressure like chest pain. Reports that the first night he began having chest pain it was all night and constant, pressure like. After, has been experiencing intermittent chest pressure, non-radiating. Patient states that episodes last minute at a time. Saw PCP today and was sent in for further evaluation. Patient denies any previous stress tests. Otherwise appears well at this time, denies any current CP. On cardiac monitor. Denies headache, dizziness, vision changes, chest pain, shortness of breath, abdominal pain, nausea, vomiting, diarrhea, fevers, chills, dysuria, hematuria, recent travel or fall. 45 yo male with no significant PMH presents to the ED ambulatory with steady gait complaining of chest pain x 1 week. Patient recently had bronchitis, finished abx about 10 days ago and as of last week has been experiencing R sided pressure like chest pain. Reports that the first night he began having chest pain it was all night and constant, pressure like. After, has been experiencing intermittent chest pressure, non-radiating. Endorses that pain feels worse upon inspiration. Patient states that episodes last minute at a time. Saw PCP today and was sent in for further evaluation. Patient denies any previous stress tests. Otherwise appears well at this time, denies any current CP. On cardiac monitor. Denies headache, dizziness, vision changes, chest pain, shortness of breath, abdominal pain, nausea, vomiting, diarrhea, fevers, chills, dysuria, hematuria, recent travel or fall.

## 2022-03-01 NOTE — ED PROVIDER NOTE - ATTENDING CONTRIBUTION TO CARE
Private Physician Ernesto Collazo  46y male pmh COPD/Current Smoker, Gerd, pt comes to ed c/o chest pain off/on past week, onset w exercise. Assocated w shortness of breath, No radiation, diaphoresis. nvdc, dizzy/loc. Lasts several mintues at a time, Now pain free. Has had #4 episodes over past two weeks. Pressure, 8/10. PE WDWN male awake alert normocephalic atraumatic neck supple chest clear anterior & posterior cv no rubs, gallops or murmurs abd soft +bs no mass guarding neruo no focal defects  Trav Serrano MD, Facep

## 2022-03-01 NOTE — H&P ADULT - NSHPLABSRESULTS_GEN_ALL_CORE
LABS:                        14.4   11.81 )-----------( 237      ( 01 Mar 2022 11:42 )             44.4     03-01    138  |  102  |  24<H>  ----------------------------<  119<H>  4.6   |  22  |  1.04    Ca    9.4      01 Mar 2022 11:42  Mg     2.2     03-01    TPro  7.4  /  Alb  4.6  /  TBili  0.4  /  DBili  x   /  AST  21  /  ALT  27  /  AlkPhos  75  03-01

## 2022-03-01 NOTE — ED ADULT TRIAGE NOTE - CHIEF COMPLAINT QUOTE
pain sternal and toward right. Worse upon deep inspiration. No fever or chills. Recent bronchitis. on prednisone. Finished antibiotic 10 days ago

## 2022-03-01 NOTE — H&P ADULT - HISTORY OF PRESENT ILLNESS
46y male     pmh COPD/Current Smoker, GERD,     p/w 5-7 d of intermittent substernal CP, with exertional component,/sob   Recent history of bronchitis, finished ABX 10 d ago. Finishing a steroid taper.   He stated that the pain is not positional and not associated with eating. He does report a pleuritic component, no history of blodo clots.   He denies any fevers, chills, worsening cough, palpitations, LE pain or edema, nausea, vomiting or any other complaints at this time.   No history of CAD, no HTN, no fam history of CAD.

## 2022-03-01 NOTE — ED PROVIDER NOTE - NS ED ROS FT
Gen: No fever, normal appetite  Eyes: No eye irritation or discharge  ENT: No ear pain, congestion, sore throat  Resp: see HPI  Cardiovascular: see HPI  Gastroenteric: No nausea/vomiting, diarrhea, constipation  :  No change in urine output; no dysuria  MS: No joint or muscle pain  Skin: No rashes  Neuro: No headache; no abnormal movements  Remainder negative, except as per the HPI

## 2022-03-01 NOTE — ED PROVIDER NOTE - PROGRESS NOTE DETAILS
Rico Wilks MD (PGY-2): case discussed with Dr. Nowak, cardiology, who will see the patient at bedside.

## 2022-03-01 NOTE — ED PROVIDER NOTE - OBJECTIVE STATEMENT
46y male pmh COPD/Current Smoker, GERD, p/w 5-7 d of intermittent substernal CP, with exertional component, without radiation and associated with SOB. Recent history of bronchitis, finished ABX 10 d ago. Finishing a steroid taper. He stated that the pain is NOT positional and not associated with eating. He does report a pleuritic component, no history of blodo clots. He denies any fevers, chills, worsening cough, palpitations, LE pain or edema, nausea, vomiting or any other complaints at this time. No history of CAD, no HTN, no fam history of CAD.

## 2022-03-01 NOTE — CONSULT NOTE ADULT - ASSESSMENT
46y male pmh COPD/Current Smoker, GERD,p/w 5-7 d of intermittent substernal CP, with exertional component,/sob   Recent history of bronchitis, finished ABX 10 d ago. Finishing a steroid taper.   He stated that the pain is not positional and not associated with eating. He does report a pleuritic component, no history of blodo clots.   He denies any fevers, chills, worsening cough, palpitations, LE pain or edema, nausea, vomiting or any other complaints at this time.   No history of CAD, no HTN, no fam history of CAD.   pt with pleuritc chest pain, with no sig ecg abnormality and normal chest x ray  ?PE, check d dimer if elevated CTA  echo  tele  repeat ECG  asa daily  lipitor/ lipid panel

## 2022-03-02 ENCOUNTER — TRANSCRIPTION ENCOUNTER (OUTPATIENT)
Age: 47
End: 2022-03-02

## 2022-03-02 VITALS
DIASTOLIC BLOOD PRESSURE: 75 MMHG | HEART RATE: 57 BPM | RESPIRATION RATE: 18 BRPM | SYSTOLIC BLOOD PRESSURE: 113 MMHG | OXYGEN SATURATION: 97 % | TEMPERATURE: 98 F

## 2022-03-02 LAB
CHOLEST SERPL-MCNC: 205 MG/DL — HIGH
CHOLEST SERPL-MCNC: 225 MG/DL — HIGH
D DIMER BLD IA.RAPID-MCNC: <150 NG/ML DDU — SIGNIFICANT CHANGE UP
ERYTHROCYTE [SEDIMENTATION RATE] IN BLOOD: 10 MM/HR — SIGNIFICANT CHANGE UP (ref 0–15)
HDLC SERPL-MCNC: 54 MG/DL — SIGNIFICANT CHANGE UP
HDLC SERPL-MCNC: 61 MG/DL — SIGNIFICANT CHANGE UP
LIPID PNL WITH DIRECT LDL SERPL: 129 MG/DL — HIGH
LIPID PNL WITH DIRECT LDL SERPL: 147 MG/DL — HIGH
NON HDL CHOLESTEROL: 144 MG/DL — HIGH
NON HDL CHOLESTEROL: 171 MG/DL — HIGH
TRIGL SERPL-MCNC: 117 MG/DL — SIGNIFICANT CHANGE UP
TRIGL SERPL-MCNC: 75 MG/DL — SIGNIFICANT CHANGE UP
TSH SERPL-MCNC: 1.77 UIU/ML — SIGNIFICANT CHANGE UP (ref 0.27–4.2)

## 2022-03-02 PROCEDURE — 84443 ASSAY THYROID STIM HORMONE: CPT

## 2022-03-02 PROCEDURE — 96372 THER/PROPH/DIAG INJ SC/IM: CPT

## 2022-03-02 PROCEDURE — 80048 BASIC METABOLIC PNL TOTAL CA: CPT

## 2022-03-02 PROCEDURE — 85379 FIBRIN DEGRADATION QUANT: CPT

## 2022-03-02 PROCEDURE — U0005: CPT

## 2022-03-02 PROCEDURE — 93306 TTE W/DOPPLER COMPLETE: CPT

## 2022-03-02 PROCEDURE — 36415 COLL VENOUS BLD VENIPUNCTURE: CPT

## 2022-03-02 PROCEDURE — 85025 COMPLETE CBC W/AUTO DIFF WBC: CPT

## 2022-03-02 PROCEDURE — 99285 EMERGENCY DEPT VISIT HI MDM: CPT | Mod: 25

## 2022-03-02 PROCEDURE — 82550 ASSAY OF CK (CPK): CPT

## 2022-03-02 PROCEDURE — 93306 TTE W/DOPPLER COMPLETE: CPT | Mod: 26

## 2022-03-02 PROCEDURE — 93010 ELECTROCARDIOGRAM REPORT: CPT

## 2022-03-02 PROCEDURE — 85652 RBC SED RATE AUTOMATED: CPT

## 2022-03-02 PROCEDURE — 83735 ASSAY OF MAGNESIUM: CPT

## 2022-03-02 PROCEDURE — 71046 X-RAY EXAM CHEST 2 VIEWS: CPT

## 2022-03-02 PROCEDURE — 83880 ASSAY OF NATRIURETIC PEPTIDE: CPT

## 2022-03-02 PROCEDURE — U0003: CPT

## 2022-03-02 PROCEDURE — 84484 ASSAY OF TROPONIN QUANT: CPT

## 2022-03-02 PROCEDURE — 82553 CREATINE MB FRACTION: CPT

## 2022-03-02 PROCEDURE — 80053 COMPREHEN METABOLIC PANEL: CPT

## 2022-03-02 PROCEDURE — 93005 ELECTROCARDIOGRAM TRACING: CPT

## 2022-03-02 PROCEDURE — 80061 LIPID PANEL: CPT

## 2022-03-02 PROCEDURE — 85027 COMPLETE CBC AUTOMATED: CPT

## 2022-03-02 RX ORDER — ATORVASTATIN CALCIUM 80 MG/1
1 TABLET, FILM COATED ORAL
Qty: 30 | Refills: 0
Start: 2022-03-02 | End: 2022-03-31

## 2022-03-02 RX ORDER — ALBUTEROL 90 UG/1
2 AEROSOL, METERED ORAL
Qty: 0 | Refills: 0 | DISCHARGE

## 2022-03-02 RX ORDER — ASPIRIN/CALCIUM CARB/MAGNESIUM 324 MG
1 TABLET ORAL
Qty: 30 | Refills: 0
Start: 2022-03-02 | End: 2022-03-31

## 2022-03-02 RX ORDER — IPRATROPIUM/ALBUTEROL SULFATE 18-103MCG
3 AEROSOL WITH ADAPTER (GRAM) INHALATION
Qty: 0 | Refills: 0 | DISCHARGE

## 2022-03-02 RX ORDER — FLUTICASONE FUROATE, UMECLIDINIUM BROMIDE AND VILANTEROL TRIFENATATE 200; 62.5; 25 UG/1; UG/1; UG/1
1 POWDER RESPIRATORY (INHALATION)
Qty: 0 | Refills: 0 | DISCHARGE

## 2022-03-02 RX ORDER — OMEPRAZOLE 10 MG/1
1 CAPSULE, DELAYED RELEASE ORAL
Qty: 0 | Refills: 0 | DISCHARGE

## 2022-03-02 RX ORDER — GUAIFENESIN/DEXTROMETHORPHAN 600MG-30MG
1 TABLET, EXTENDED RELEASE 12 HR ORAL
Qty: 0 | Refills: 0 | DISCHARGE

## 2022-03-02 RX ORDER — ATORVASTATIN CALCIUM 80 MG/1
10 TABLET, FILM COATED ORAL AT BEDTIME
Refills: 0 | Status: DISCONTINUED | OUTPATIENT
Start: 2022-03-02 | End: 2022-03-02

## 2022-03-02 RX ADMIN — LOSARTAN POTASSIUM 25 MILLIGRAM(S): 100 TABLET, FILM COATED ORAL at 06:32

## 2022-03-02 RX ADMIN — HEPARIN SODIUM 5000 UNIT(S): 5000 INJECTION INTRAVENOUS; SUBCUTANEOUS at 06:32

## 2022-03-02 RX ADMIN — Medication 81 MILLIGRAM(S): at 11:42

## 2022-03-02 NOTE — DISCHARGE NOTE NURSING/CASE MANAGEMENT/SOCIAL WORK - NSDCPEFALRISK_GEN_ALL_CORE
For information on Fall & Injury Prevention, visit: [email or web address] OR  [email or web address] OR  https://www.cdc.gov/steadi/patient.html

## 2022-03-02 NOTE — DISCHARGE NOTE PROVIDER - HOSPITAL COURSE
46y male pmh COPD/Current Smoker, GERD,p/w 5-7 d of intermittent substernal CP, with exertional component,/sob. Recent history of bronchitis, finished ABX 10 d ago. Finishing a steroid taper. He stated that the pain is not positional and not associated with eating. He does report a pleuritic component, no history of blodo clots. D-dimer was WNL. He denies any fevers, chills, worsening cough, palpitations, LE pain or edema, nausea, vomiting or any other complaints at this time. No history of CAD, no HTN, no fam history of CAD. No sig ecg abnormality and normal chest x ray. Echo was WNL w/ normal LV fx. Will get stress test as outpatient on 3/7/22 at 1pm w/ Dr. Nowak. Pt is medically stable and optimized to DC w/ outpatient follow up.

## 2022-03-02 NOTE — DISCHARGE NOTE PROVIDER - PROVIDER TOKENS
FREE:[LAST:[vinny],FIRST:[julio],PHONE:[phone]],FAX:[(   [phone]    -],ADDRESS:[[address] [place] [address]],SCHEDULEDAPPT:[03/07/2022],SCHEDULEDAPPTTIME:[01:00 PM]]

## 2022-03-02 NOTE — DISCHARGE NOTE PROVIDER - NSDCMRMEDTOKEN_GEN_ALL_CORE_FT
Albuterol (Eqv-ProAir HFA) 90 mcg/inh inhalation aerosol: 2 puff(s) inhaled every 6 hours, As Needed  ipratropium-albuterol 0.5 mg-2.5 mg/3 mL inhalation solution: 3 milliliter(s) inhaled 3 times a day, As Needed  Mucinex  mg-30 mg oral tablet, extended release: 1 tab(s) orally every 12 hours  omeprazole 40 mg oral delayed release capsule: 1 cap(s) orally once a day  Trelegy Ellipta 100 mcg-62.5 mcg-25 mcg/inh inhalation powder: 1 puff(s) inhaled once a day   Albuterol (Eqv-ProAir HFA) 90 mcg/inh inhalation aerosol: 2 puff(s) inhaled every 6 hours, As Needed  aspirin 81 mg oral tablet, chewable: 1 tab(s) orally once a day  ipratropium-albuterol 0.5 mg-2.5 mg/3 mL inhalation solution: 3 milliliter(s) inhaled 3 times a day, As Needed  Lipitor 10 mg oral tablet: 1 tab(s) orally once a day   Mucinex  mg-30 mg oral tablet, extended release: 1 tab(s) orally every 12 hours  omeprazole 40 mg oral delayed release capsule: 1 cap(s) orally once a day  Trelegy Ellipta 100 mcg-62.5 mcg-25 mcg/inh inhalation powder: 1 puff(s) inhaled once a day

## 2022-03-02 NOTE — PROGRESS NOTE ADULT - SUBJECTIVE AND OBJECTIVE BOX
CARDIOLOGY     PROGRESS  NOTE   ________________________________________________    CHIEF COMPLAINT:Patient is a 46y old  Male who presents with a chief complaint of cp/sob (01 Mar 2022 20:37)  no complain  tele no arrythmia  	  REVIEW OF SYSTEMS:  CONSTITUTIONAL: No fever, weight loss, or fatigue  EYES: No eye pain, visual disturbances, or discharge  ENT:  No difficulty hearing, tinnitus, vertigo; No sinus or throat pain  NECK: No pain or stiffness  RESPIRATORY: No cough, wheezing, chills or hemoptysis; No Shortness of Breath  CARDIOVASCULAR: No chest pain, palpitations, passing out, dizziness, or leg swelling  GASTROINTESTINAL: No abdominal or epigastric pain. No nausea, vomiting, or hematemesis; No diarrhea or constipation. No melena or hematochezia.  GENITOURINARY: No dysuria, frequency, hematuria, or incontinence  NEUROLOGICAL: No headaches, memory loss, loss of strength, numbness, or tremors  SKIN: No itching, burning, rashes, or lesions   LYMPH Nodes: No enlarged glands  ENDOCRINE: No heat or cold intolerance; No hair loss  MUSCULOSKELETAL: No joint pain or swelling; No muscle, back, or extremity pain  PSYCHIATRIC: No depression, anxiety, mood swings, or difficulty sleeping  HEME/LYMPH: No easy bruising, or bleeding gums  ALLERGY AND IMMUNOLOGIC: No hives or eczema	    [ ] All others negative	  [ ] Unable to obtain    PHYSICAL EXAM:  T(C): 36.3 (03-02-22 @ 04:34), Max: 37.2 (03-01-22 @ 11:28)  HR: 58 (03-02-22 @ 04:34) (52 - 77)  BP: 101/69 (03-02-22 @ 04:34) (101/69 - 163/92)  RR: 17 (03-02-22 @ 04:34) (15 - 20)  SpO2: 96% (03-02-22 @ 04:34) (96% - 100%)  Wt(kg): --  I&O's Summary      Appearance: Normal	  HEENT:   Normal oral mucosa, PERRL, EOMI	  Lymphatic: No lymphadenopathy  Cardiovascular: Normal S1 S2, No JVD, + murmurs, No edema  Respiratory: Lungs clear to auscultation	  Psychiatry: A & O x 3, Mood & affect appropriate  Gastrointestinal:  Soft, Non-tender, + BS	  Skin: No rashes, No ecchymoses, No cyanosis	  Neurologic: Non-focal  Extremities: Normal range of motion, No clubbing, cyanosis or edema  Vascular: Peripheral pulses palpable 2+ bilaterally    MEDICATIONS  (STANDING):  aspirin  chewable 81 milliGRAM(s) Oral daily  atorvastatin 80 milliGRAM(s) Oral at bedtime  heparin   Injectable 5000 Unit(s) SubCutaneous every 12 hours  losartan 25 milliGRAM(s) Oral daily      TELEMETRY: 	    ECG:  	  RADIOLOGY:  OTHER: 	  	  LABS:	 	    CARDIAC MARKERS:  CARDIAC MARKERS ( 01 Mar 2022 23:08 )  x     / x     / 106 U/L / x     / 1.7 ng/mL                                13.7   9.97  )-----------( 224      ( 01 Mar 2022 20:11 )             42.7     03-01    138  |  101  |  22  ----------------------------<  147<H>  3.8   |  27  |  1.12    Ca    9.3      01 Mar 2022 20:11  Mg     2.2     03-01    TPro  7.4  /  Alb  4.6  /  TBili  0.4  /  DBili  x   /  AST  21  /  ALT  27  /  AlkPhos  75  03-01    proBNP: Serum Pro-Brain Natriuretic Peptide: 28 pg/mL (03-01 @ 11:42)    Lipid Profile: Cholesterol 205  LDL --  HDL 61  TG 75    HgA1c:   TSH:     Troponin T, High Sensitivity (03.01.22 @ 23:08)    Troponin T, High Sensitivity Result: <6: Specimen not hemolyzed  *  *  Rapid upward or downward changes in high-sensitivity troponin levels  suggest acute myocardial injury. Renal impairment may cause sustained  troponin elevations.  Normal: <6 - 14 ng/L  Indeterminate: 15-51 ng/L  Elevated: > 51 ng/L  See http://labs/test/TROPTHS on the [facility] intranet for more  information ng/L        Assessment and plan  ---------------------------   46y male pmh COPD/Current Smoker, GERD,p/w 5-7 d of intermittent substernal CP, with exertional component,/sob   Recent history of bronchitis, finished ABX 10 d ago. Finishing a steroid taper.   He stated that the pain is not positional and not associated with eating. He does report a pleuritic component, no history of blodo clots.   He denies any fevers, chills, worsening cough, palpitations, LE pain or edema, nausea, vomiting or any other complaints at this time.   No history of CAD, no HTN, no fam history of CAD.   pt with pleuritc chest pain, with no sig ecg abnormality and normal chest x ray  ?PE, check d dimer if elevated CTA  echo  tele  repeat ECG  asa daily  lipitor/ lipid panel  stress test    	        
  afebrile    REVIEW OF SYSTEMS:  GEN: no fever,    no chills  RESP: no SOB,   no cough  CVS: no chest pain,   no palpitations  GI: no abdominal pain,   no nausea,   no vomiting,   no constipation,   no diarrhea  : no dysuria,   no frequency  NEURO: no headache,   no dizziness  PSYCH: no depression,   not anxious  Derm : no rash    MEDICATIONS  (STANDING):  aspirin  chewable 81 milliGRAM(s) Oral daily  atorvastatin 80 milliGRAM(s) Oral at bedtime  heparin   Injectable 5000 Unit(s) SubCutaneous every 12 hours  losartan 25 milliGRAM(s) Oral daily    MEDICATIONS  (PRN):      Vital Signs Last 24 Hrs  T(C): 36.3 (02 Mar 2022 04:34), Max: 37.2 (01 Mar 2022 11:28)  T(F): 97.4 (02 Mar 2022 04:34), Max: 98.9 (01 Mar 2022 11:28)  HR: 58 (02 Mar 2022 04:34) (52 - 77)  BP: 101/69 (02 Mar 2022 04:34) (101/69 - 163/92)  BP(mean): --  RR: 17 (02 Mar 2022 04:34) (15 - 20)  SpO2: 96% (02 Mar 2022 04:34) (96% - 100%)  CAPILLARY BLOOD GLUCOSE        I&O's Summary      PHYSICAL EXAM:  HEAD:  Atraumatic, Normocephalic  NECK: Supple, No   JVD  CHEST/LUNG:   no     rales,     no,    rhonchi  HEART: Regular rate and rhythm;         murmur  ABDOMEN: Soft, Nontender, ;   EXTREMITIES:     no   edema  NEUROLOGY:  alert    LABS:                        13.7   9.97  )-----------( 224      ( 01 Mar 2022 20:11 )             42.7     03-01    138  |  101  |  22  ----------------------------<  147<H>  3.8   |  27  |  1.12    Ca    9.3      01 Mar 2022 20:11  Mg     2.2     03-01    TPro  7.4  /  Alb  4.6  /  TBili  0.4  /  DBili  x   /  AST  21  /  ALT  27  /  AlkPhos  75  03-01      CARDIAC MARKERS ( 01 Mar 2022 23:08 )  x     / x     / 106 U/L / x     / 1.7 ng/mL                        Consultant(s) Notes Reviewed:      Care Discussed with Consultants/Other Providers:

## 2022-03-02 NOTE — PROGRESS NOTE ADULT - ASSESSMENT
46y male     pmh COPD/Current Smoker, GERD,     p/w 5-7 d of intermittent substernal CP, with exertional component, /sob   Recent history of bronchitis, finished ABX 10 d ago. Finishing a steroid taper.    no history of blodo clots./  denies any fevers, chills, worsening cough, palpitations, LE pain or edema,   No history of CAD, no HTN, no fam history of CAD.       admitted with cp/sob   tele'  troponin is normal   echo/  card dr casillas   on asa/ lipitor/ cozaar   on dvt ppx  ischemic  w/p to card/ stress  test  smoking  cessation/ counselling done/ pt not reday yet  awiating stress  test  today  46y male     pmh COPD/Current Smoker, GERD,     p/w 5-7 d of intermittent substernal CP, with exertional component, /sob   Recent history of bronchitis, finished ABX 10 d ago. Finishing a steroid taper.    no history of blodo clots./  denies any fevers, chills, worsening cough, palpitations, LE pain or edema,   No history of CAD, no HTN, no fam history of CAD.       admitted with cp/sob   tele'  troponin is normal   echo/  card dr casillas   on asa/ lipitor/ cozaar   on dvt ppx  ischemic  w/p to card/ stress  test  smoking  cessation/ counselling done/ pt not reday yet  awiating stress  test  today  addendum, stress  lab will  not  do it   hence, pe r card,  stres  test  as  out  pt.  will  d/c  pt

## 2022-03-02 NOTE — PATIENT PROFILE ADULT - FALL HARM RISK - UNIVERSAL INTERVENTIONS
Bed in lowest position, wheels locked, appropriate side rails in place/Call bell, personal items and telephone in reach/Instruct patient to call for assistance before getting out of bed or chair/Non-slip footwear when patient is out of bed/[place] to call system/Physically safe environment - no spills, clutter or unnecessary equipment/Purposeful Proactive Rounding/Room/bathroom lighting operational, light cord in reach

## 2022-03-02 NOTE — DISCHARGE NOTE PROVIDER - NSDCCPCAREPLAN_GEN_ALL_CORE_FT
PRINCIPAL DISCHARGE DIAGNOSIS  Diagnosis: Chest pain  Assessment and Plan of Treatment: - Seen by cardiology  - D-dimer is normal  - Now resolved  - Echocardiogram was normal, normal LV funciton, EF (Lazaro Rule): 67 %  - Outpatient stress test with Dr. Nowak on 3/7/22 at 1pm  - Continue aspirin 81mg daily and lipitor 10mg at night

## 2022-03-02 NOTE — DISCHARGE NOTE PROVIDER - CARE PROVIDER_API CALL
julio casillas  [address] [facility]  Phone: [phone]  Fax: (   )    -  Scheduled Appointment: 03/07/2022 01:00 PM

## 2022-03-02 NOTE — DISCHARGE NOTE NURSING/CASE MANAGEMENT/SOCIAL WORK - PATIENT PORTAL LINK FT
You can access the FollowMyHealth Patient Portal offered by [facility] by registering at the following website: [email or web address]. By joining [facility]’s FollowMyHealth portal, you will also be able to view your health information using other applications (apps) compatible with our system.
